# Patient Record
Sex: FEMALE | Race: WHITE | RURAL
[De-identification: names, ages, dates, MRNs, and addresses within clinical notes are randomized per-mention and may not be internally consistent; named-entity substitution may affect disease eponyms.]

---

## 2017-09-29 ENCOUNTER — OFFICE VISIT (OUTPATIENT)
Dept: FAMILY MEDICINE CLINIC | Age: 62
End: 2017-09-29

## 2017-09-29 VITALS
OXYGEN SATURATION: 98 % | HEART RATE: 110 BPM | SYSTOLIC BLOOD PRESSURE: 157 MMHG | HEIGHT: 66 IN | BODY MASS INDEX: 21.05 KG/M2 | TEMPERATURE: 98.8 F | WEIGHT: 131 LBS | DIASTOLIC BLOOD PRESSURE: 94 MMHG

## 2017-09-29 DIAGNOSIS — Z51.81 THERAPEUTIC DRUG MONITORING: ICD-10-CM

## 2017-09-29 DIAGNOSIS — F79 MENTAL RETARDATION: ICD-10-CM

## 2017-09-29 DIAGNOSIS — E78.2 MIXED HYPERLIPIDEMIA: ICD-10-CM

## 2017-09-29 DIAGNOSIS — G40.909 SEIZURE DISORDER (HCC): Primary | ICD-10-CM

## 2017-09-29 RX ORDER — PHENYTOIN SODIUM 100 MG/1
200 CAPSULE, EXTENDED RELEASE ORAL
Qty: 180 CAP | Refills: 3 | Status: SHIPPED | OUTPATIENT
Start: 2017-09-29 | End: 2018-09-25 | Stop reason: SDUPTHER

## 2017-09-29 NOTE — MR AVS SNAPSHOT
Visit Information Date & Time Provider Department Dept. Phone Encounter #  
 9/29/2017 11:30 AM Aixa Patel, Katie Still [id number] Follow-up Instructions Return in about 1 year (around 9/29/2018). Follow-up and Disposition History Upcoming Health Maintenance Date Due Hepatitis C Screening 1955 PAP AKA CERVICAL CYTOLOGY 5/14/1977 BREAST CANCER SCRN MAMMOGRAM 5/14/2006 FOBT Q 1 YEAR AGE 50-75 9/29/2017 DTaP/Tdap/Td series (2 - Td) 9/29/2027 Allergies as of 9/29/2017  Review Complete On: 9/29/2017 By: Aixa Patel MD  
 No Known Allergies Current Immunizations  Never Reviewed No immunizations on file. Not reviewed this visit You Were Diagnosed With   
  
 Codes Comments [facility]iz[facility] ([facility]    -  Primary ICD-10-CM: F32.266 ICD-9-CM: 345.90 Mental retardation     ICD-10-CM: F79 
ICD-9-CM: 700 Therapeutic drug monitoring     ICD-10-CM: Z51.81 
ICD-9-CM: V58.83 Mixed hyperlipidemia     ICD-10-CM: E78.2 ICD-9-CM: 272.2 Vitals BP Pulse Temp Height(growth percentile) Weight(growth percentile) SpO2  
 (!) 157/94 (!) 110 98.8 °F (37.1 °C) (Temporal) 5' 6\" (1.676 m) 131 lb (59.4 kg) 98% BMI OB Status Smoking Status 21.14 kg/m2 Unknown Never Smoker BMI and BSA Data Body Mass Index Body Surface Area  
 21.14 kg/m 2 1.66 m 2 Preferred Pharmacy Pharmacy Name Phone [facility] [phone] [address] [phone] Your Updated Medication List  
  
   
This list is accurate as of: 9/29/17 12:11 PM.  Always use your most recent med list.  
  
  
  
  
 CENTRUM SILVER PO Take  by mouth.  
  
 phenytoin  mg ER capsule Commonly known as:  DILANTIN ER Take 2 Caps by mouth nightly. Indications: prevent seizure Prescriptions Sent to Pharmacy Refills phenytoin ER (DILANTIN ER) 100 mg ER capsule 3 Sig: Take 2 Caps by mouth nightly. Indications: prevent seizure Class: Normal  
 Pharmacy: [facility] [id number]  #: [phone] Route: Oral  
  
We Performed the Following CBC WITH AUTOMATED DIFF [55117 CPT(R)] LIPID PANEL [63444 CPT(R)] METABOLIC PANEL, COMPREHENSIVE [95654 CPT(R)] PHENYTOIN [id number] CPT(R)] Follow-up Instructions Return in about 1 year (around 9/29/2018). Patient Instructions Keep up the good work Introducing [facility]! [employer] introduces [employer] patient portal. Now you can access parts of your medical record, email your doctor's office, and request medication refills online. 1. In your internet browser, go to [email or web address] 2. Click on the First Time User? Click Here link in the Sign In box. You will see the New Member Sign Up page. 3. Enter your [employer] Access Code exactly as it appears below. You will not need to use this code after youve completed the sign-up process. If you do not sign up before the expiration date, you must request a new code. · [employer] Access Code: Eve Alvarez Expires: 12/28/2017 11:35 AM 
 
4. Enter the last four digits of your Social Security Number (xxxx) and Date of Birth (mm/dd/yyyy) as indicated and click Submit. You will be taken to the next sign-up page. 5. Create a [employer] ID. This will be your [employer] login ID and cannot be changed, so think of one that is secure and easy to remember. 6. Create a [employer] password. You can change your password at any time. 7. Enter your Password Reset Question and Answer. This can be used at a later time if you forget your password. 8. Enter your e-mail address. You will receive e-mail notification when new information is available in [address] 9. Click Sign Up. You can now view and download portions of your medical record. 10. Click the Download Summary menu link to download a portable copy of your medical information. If you have questions, please visit the Frequently Asked Questions section of the [employer] website. Remember, [employer] is NOT to be used for urgent needs. For medical emergencies, dial 911. Now available from your iPhone and Android! Please provide this summary of care documentation to your next provider. Your primary care clinician is listed as Alayna Mott. If you have any questions after today's visit, please call [phone].

## 2017-09-29 NOTE — PROGRESS NOTES
HISTORY OF PRESENT ILLNESS  Piotr Akers is a 63 y.o. Female here for f/u seizure d/o. Doing well since last visit. Taking Dilantin 200 at bedtime. Stef well. No seizures since last visit, actually in many years. Pt feeling well, eating normally. Lab Results   Component Value Date/Time    Phenytoin 6.5 07/22/2016 09:21 AM     Lab Results   Component Value Date/Time    WBC 5.9 07/22/2016 09:21 AM    HGB 13.5 07/22/2016 09:21 AM    HCT 39.7 07/22/2016 09:21 AM    PLATELET 877 86/27/2304 09:21 AM    MCV 86 07/22/2016 09:21 AM     Lab Results   Component Value Date/Time    Sodium 141 07/22/2016 09:21 AM    Potassium 4.2 07/22/2016 09:21 AM    Chloride 98 07/22/2016 09:21 AM    CO2 25 07/22/2016 09:21 AM    Glucose 93 07/22/2016 09:21 AM    BUN 8 07/22/2016 09:21 AM    Creatinine 0.71 07/22/2016 09:21 AM    BUN/Creatinine ratio 11 07/22/2016 09:21 AM    GFR est  07/22/2016 09:21 AM    GFR est non-AA 93 07/22/2016 09:21 AM    Calcium 9.7 07/22/2016 09:21 AM    Bilirubin, total 0.3 07/22/2016 09:21 AM    AST (SGOT) 15 07/22/2016 09:21 AM    Alk. phosphatase 127 07/22/2016 09:21 AM    Protein, total 7.5 07/22/2016 09:21 AM    Albumin 4.7 07/22/2016 09:21 AM    A-G Ratio 1.7 07/22/2016 09:21 AM    ALT (SGPT) 17 07/22/2016 09:21 AM     Lab Results   Component Value Date/Time    Cholesterol, total 246 07/22/2016 09:21 AM    HDL Cholesterol 88 07/22/2016 09:21 AM    LDL, calculated 140 07/22/2016 09:21 AM    VLDL, calculated 18 07/22/2016 09:21 AM    Triglyceride 91 07/22/2016 09:21 AM     PMH, SH, Medications/Allergies: reviewed, on chart.     ROS:  Constitutional: No fever, chills or weight loss  Respiratory: No cough, SOB   CV: No chest pain or Palpitations    Visit Vitals    BP (!) 157/94    Pulse (!) 110    Temp 98.8 °F (37.1 °C) (Temporal)    Ht 5' 6\" (1.676 m)    Wt 131 lb (59.4 kg)    SpO2 98%    BMI 21.14 kg/m2     Wt Readings from Last 3 Encounters:   09/29/17 131 lb (59.4 kg)   07/22/16 140 lb (63.5 kg)   07/16/15 133 lb (60.3 kg)     Physical Exam   Constitutional: She is oriented to person, place, and time. She appears well-developed and well-nourished. HENT:   Head: Normocephalic. Eyes: EOM are normal. Pupils are equal, round, and reactive to light. Neck: No JVD present. No thyromegaly present. Cardiovascular: Normal rate and regular rhythm. No M/R/G. Pulmonary/Chest: Breath sounds normal.   Musculoskeletal: She exhibits no edema and no tenderness. Neurological: She is alert and oriented to person. Mild spastic change to R arm and fingers. Skin: Skin is warm and dry. Psychiatric: She has a normal mood and affect. Her behavior is normal. Thought content sparse, pt is mostly nonverbal.     ASSESSMENT and PLAN  1. Seizure disorder and CP. Doing well. Refill Dilantin 200 at bedtime  Check annual dilantin level, CMP, and CBC. Supervised full duty    Breast cancer screening  Discussed recommendations, pt will consider. Colon ca screening  Recheck annual FOBT. FIT given again. Hyperlipidemia  Check lipids. Cervical ca screening  Pt has no exposure, never been sexually active. Declines screening today. F/U annually for recheck.

## 2017-09-30 LAB
ALBUMIN SERPL-MCNC: 5 G/DL (ref 3.6–4.8)
ALBUMIN/GLOB SERPL: 1.8 {RATIO} (ref 1.2–2.2)
ALP SERPL-CCNC: 128 IU/L (ref 39–117)
ALT SERPL-CCNC: 12 IU/L (ref 0–32)
AST SERPL-CCNC: 16 IU/L (ref 0–40)
BASOPHILS # BLD AUTO: 0 X10E3/UL (ref 0–0.2)
BASOPHILS NFR BLD AUTO: 1 %
BILIRUB SERPL-MCNC: 0.3 MG/DL (ref 0–1.2)
BUN SERPL-MCNC: 7 MG/DL (ref 8–27)
BUN/CREAT SERPL: 9 (ref 12–28)
CALCIUM SERPL-MCNC: 9.9 MG/DL (ref 8.7–10.3)
CHLORIDE SERPL-SCNC: 100 MMOL/L (ref 96–106)
CHOLEST SERPL-MCNC: 253 MG/DL (ref 100–199)
CO2 SERPL-SCNC: 25 MMOL/L (ref 18–29)
CREAT SERPL-MCNC: 0.75 MG/DL (ref 0.57–1)
EOSINOPHIL # BLD AUTO: 0.1 X10E3/UL (ref 0–0.4)
EOSINOPHIL NFR BLD AUTO: 1 %
ERYTHROCYTE [DISTWIDTH] IN BLOOD BY AUTOMATED COUNT: 13.3 % (ref 12.3–15.4)
GLOBULIN SER CALC-MCNC: 2.8 G/DL (ref 1.5–4.5)
GLUCOSE SERPL-MCNC: 100 MG/DL (ref 65–99)
HCT VFR BLD AUTO: 40.4 % (ref 34–46.6)
HDLC SERPL-MCNC: 102 MG/DL
HGB BLD-MCNC: 13.8 G/DL (ref 11.1–15.9)
IMM GRANULOCYTES # BLD: 0 X10E3/UL (ref 0–0.1)
IMM GRANULOCYTES NFR BLD: 0 %
LDLC SERPL CALC-MCNC: 127 MG/DL (ref 0–99)
LYMPHOCYTES # BLD AUTO: 1 X10E3/UL (ref 0.7–3.1)
LYMPHOCYTES NFR BLD AUTO: 17 %
MCH RBC QN AUTO: 29.9 PG (ref 26.6–33)
MCHC RBC AUTO-ENTMCNC: 34.2 G/DL (ref 31.5–35.7)
MCV RBC AUTO: 88 FL (ref 79–97)
MONOCYTES # BLD AUTO: 0.5 X10E3/UL (ref 0.1–0.9)
MONOCYTES NFR BLD AUTO: 9 %
NEUTROPHILS # BLD AUTO: 4 X10E3/UL (ref 1.4–7)
NEUTROPHILS NFR BLD AUTO: 72 %
PHENYTOIN SERPL-MCNC: NORMAL UG/ML
PLATELET # BLD AUTO: 255 X10E3/UL (ref 150–379)
POTASSIUM SERPL-SCNC: 3.7 MMOL/L (ref 3.5–5.2)
PROT SERPL-MCNC: 7.8 G/DL (ref 6–8.5)
RBC # BLD AUTO: 4.61 X10E6/UL (ref 3.77–5.28)
SODIUM SERPL-SCNC: 141 MMOL/L (ref 134–144)
TRIGL SERPL-MCNC: 121 MG/DL (ref 0–149)
VLDLC SERPL CALC-MCNC: 24 MG/DL (ref 5–40)
WBC # BLD AUTO: 5.6 X10E3/UL (ref 3.4–10.8)

## 2017-10-10 LAB — PHENYTOIN SERPL-MCNC: NORMAL UG/ML

## 2021-09-15 ENCOUNTER — VIRTUAL VISIT (OUTPATIENT)
Dept: FAMILY MEDICINE CLINIC | Age: 66
End: 2021-09-15
Payer: MEDICARE

## 2021-09-15 DIAGNOSIS — F79 INTELLECTUAL DISABILITY: ICD-10-CM

## 2021-09-15 DIAGNOSIS — Z12.11 COLON CANCER SCREENING: ICD-10-CM

## 2021-09-15 DIAGNOSIS — G80.4 ATAXIC CEREBRAL PALSY (HCC): ICD-10-CM

## 2021-09-15 DIAGNOSIS — G40.909 SEIZURE DISORDER (HCC): ICD-10-CM

## 2021-09-15 DIAGNOSIS — Z13.31 SCREENING FOR DEPRESSION: ICD-10-CM

## 2021-09-15 DIAGNOSIS — Z13.39 SCREENING FOR ALCOHOLISM: ICD-10-CM

## 2021-09-15 DIAGNOSIS — Z51.81 THERAPEUTIC DRUG MONITORING: ICD-10-CM

## 2021-09-15 DIAGNOSIS — Z00.00 MEDICARE ANNUAL WELLNESS VISIT, SUBSEQUENT: Primary | ICD-10-CM

## 2021-09-15 DIAGNOSIS — Z51.81 ENCOUNTER FOR MEDICATION MONITORING: ICD-10-CM

## 2021-09-15 PROCEDURE — G0444 DEPRESSION SCREEN ANNUAL: HCPCS | Performed by: FAMILY MEDICINE

## 2021-09-15 PROCEDURE — G0439 PPPS, SUBSEQ VISIT: HCPCS | Performed by: FAMILY MEDICINE

## 2021-09-15 RX ORDER — PHENYTOIN SODIUM 100 MG/1
200 CAPSULE, EXTENDED RELEASE ORAL
Qty: 180 CAPSULE | Refills: 3 | Status: SHIPPED | OUTPATIENT
Start: 2021-09-15 | End: 2021-11-12

## 2021-09-15 NOTE — PROGRESS NOTES
Chief Complaint   Patient presents with    Seizure     Chief Complaint   Patient presents with    Seizure     1. Have you been to the ER, urgent care clinic since your last visit? Hospitalized since your last visit? No  2. Have you seen or consulted any other health care providers outside of the [address] [place] [address] since your last visit? Include any pap smears or colon screening. No    HISTORY OF PRESENT ILLNESS  Corrie Dailey is a 63 y.o. Female here for f/u seizure d/o. Doing well since last visit. Taking Dilantin 200 at bedtime. Stef well. No seizures since last visit, actually in many years. Pt feeling well, eating normally. Lab Results   Component Value Date/Time    Phenytoin 7.3 (L) 09/14/2020 08:59 AM     Lab Results   Component Value Date/Time    WBC 5.0 09/14/2020 08:59 AM    HGB 13.5 09/14/2020 08:59 AM    HCT 40.5 09/14/2020 08:59 AM    PLATELET 929 98/45/7433 08:59 AM    MCV 93 09/14/2020 08:59 AM     Lab Results   Component Value Date/Time    Sodium 141 10/16/2018 03:32 PM    Potassium 4.0 10/16/2018 03:32 PM    Chloride 100 10/16/2018 03:32 PM    CO2 24 10/16/2018 03:32 PM    Glucose 111 (H) 10/16/2018 03:32 PM    BUN 10 10/16/2018 03:32 PM    Creatinine 0.77 10/16/2018 03:32 PM    BUN/Creatinine ratio 13 10/16/2018 03:32 PM    GFR est AA 96 10/16/2018 03:32 PM    GFR est non-AA 83 10/16/2018 03:32 PM    Calcium 9.8 10/16/2018 03:32 PM    Bilirubin, total <0.2 10/16/2018 03:32 PM    Alk.  phosphatase 111 10/16/2018 03:32 PM    Protein, total 7.7 10/16/2018 03:32 PM    Albumin 5.1 (H) 10/16/2018 03:32 PM    A-G Ratio 2.0 10/16/2018 03:32 PM    ALT (SGPT) 14 10/16/2018 03:32 PM     Lab Results   Component Value Date/Time    Cholesterol, total 244 (H) 09/14/2020 08:59 AM    HDL Cholesterol 92 09/14/2020 08:59 AM    LDL, calculated 143 (H) 09/14/2020 08:59 AM    LDL, calculated 120 (H) 10/16/2018 03:32 PM    VLDL, calculated 9 09/14/2020 08:59 AM    VLDL, calculated 45 (H) 10/16/2018 03:32 PM Triglyceride 58 09/14/2020 08:59 AM       PMH, , Medications/Allergies: reviewed, on chart. If parent gets ill, plans move in with brother Katya Nails in [place].  Current Outpatient Medications   Medication Sig    phenytoin ER (DILANTIN ER) 100 mg ER capsule Take 2 Capsules by mouth nightly. Indications: DUE FOR PHONE VISIT    FOLIC ACID/MULTIVIT-MIN/LUTEIN (CENTRUM SILVER PO) Take  by mouth. No current facility-administered medications for this visit. No Known Allergies    ROS:  Constitutional: No fever, chills or abnormal weight loss  Respiratory: No cough, SOB   CV: No chest pain or Palpitations    VS review: Wt Readings from Last 3 Encounters:   10/16/18 131 lb (59.4 kg)   09/29/17 131 lb (59.4 kg)   07/22/16 140 lb (63.5 kg)     BP Readings from Last 3 Encounters:   10/16/18 152/87   09/29/17 (!) 157/94   07/22/16 154/80       Objective:     General: alert, cooperative, no distress   Mental  status: mental status: alert, oriented to person, place. normal mood, behavior, speech, dress, motor activity, and thought processes   Resp: resp: normal effort and no respiratory distress   Neuro: neuro: no gross deficits         Assessment & Plan:     1. Seizure disorder and CP. Doing well. Con't Dilantin 200 at bedtime  Check annual dilantin level, CMP, and CBC. HCM:  Breast cancer screening  Discussed recommendations, pt will consider. Colon ca screening  Recheck annual FOBT. Has FIT kit, bring by when ready. Hyperlipidemia  Check lipids. F/U annually for recheck.  ______________________________________________________________________    Belinda Crawley is a 73 y.o. female and presents for annual Medicare Wellness Visit. Problem List: Reviewed with patient and discussed risk factors.     Patient Active Problem List   Diagnosis Code    Seizure disorder ([facility] G40.909    Intellectual disability F79    Cerebral palsy ([facility] G80.9    Encounter for medication monitoring Z51.81    Mixed hyperlipidemia E78.2       1. Have you been to the ER, urgent care clinic since your last visit? Hospitalized since your last visit? No    2. Have you seen or consulted any other health care providers outside of the [address] [place] [address] [place] since your last visit? Include any pap smears or colon screening. No    Current medical providers:  Patient Care Team:  Severa Dowse, MD as PCP - General (Family Medicine)  Severa Dowse, MD as PCP - [facility] Empaneled Provider  Severa Dowse, MD (Family Medicine)  Billy Melgar NP as Nurse Practitioner (Dermatology)    [facility], [facility], Medications/Allergies: reviewed, on chart. Female Alcohol Screening: On any occasion during the past 3 months, have you had more than 3 drinks containing alcohol? No    Do you average more than 7 drinks per week? No      ROS:  Constitutional: No fever, chills or weight loss  Respiratory: No cough, SOB   CV: No chest pain or Palpitations    Objective:    Assessment of cognitive impairment: Alert and oriented x 2  [employer] 0/3 recall    Depression Screen:   3 most recent PHQ Screens 9/15/2021   Little interest or pleasure in doing things Not at all   Feeling down, depressed, irritable, or hopeless Not at all   Total Score PHQ 2 0     Depression screening performed and reviewed with patient for 8-15 minutes    Fall Risk Assessment:    Fall Risk Assessment, last 12 mths 9/15/2021   Able to walk? Yes   Fall in past 12 months? 0   Do you feel unsteady? 0   Are you worried about falling 0       Functional Ability:   Does the patient exhibit a steady gait? yes   How long did it take the patient to get up and walk from a sitting position? n   Is the patient self reliant?  (ie can do own laundry, meals, household chores)  no     Does the patient handle his/her own medications? yes     Does the patient handle his/her own money?    no     Is the patients home safe (ie good lighting, handrails on stairs and bath, etc.)? yes     Did you notice or did patient express any hearing difficulties? no     Did you notice or did patient express any vision difficulties? no       Advance Care Planning:   Patient was offered the opportunity to discuss advance care planning:  yes     Does patient have an Advance Directive:  no   If no, did you provide information on [facility]ctions? yes       Plan:    Covidvax, Flu and shingles shot recommended, will consider. Can get shingrix at local pharmacy on request.    Work on AMD at next check. Mammogram  PT will consider, declines today. Cervical ca screening  Pt has no exposure, never been sexually active. Declines screening today. Colon ca screening  Discussed FIT, will  card when comes in for labs. Orders Placed This Encounter    Depression Screen Annual       Health Maintenance   Topic Date Due    COVID-19 Vaccine (1) Never done    Cervical cancer screen  Never done    Shingrix Vaccine Age 50> (1 of 2) Never done    Breast Cancer Screen Mammogram  Never done    Colorectal Cancer Screening Combo  2017    Bone Densitometry (Dexa) Screening  Never done    Pneumococcal 65+ years (1 of 1 - PPSV23) Never done    Flu Vaccine (1) Never done    Medicare Yearly Exam  2022    Lipid Screen  2025    DTaP/Tdap/Td series (2 - Td or Tdap) 2027    Hepatitis C Screening  Completed     *Patient verbalized understanding and agreement with the plan. A copy of the After Visit Summary with personalized health plan was given to the patient today. Identified pt with two pt identifiers(name and ). Reviewed record in preparation for visit and have obtained necessary documentation.     Symptom review:    NO  Fever   NO  Shaking chills  NO  Cough  NO Headaches  NO  Body aches  NO  Coughing up blood  NO  Chest congestion  NO  Chest pain  NO  Shortness of breath  NO  Profound Loss of smell/taste  NO  Nausea/Vomiting   NO  Loose stool/Diarrhea  NO any skin issues

## 2021-09-29 ENCOUNTER — LAB ONLY (OUTPATIENT)
Dept: FAMILY MEDICINE CLINIC | Age: 66
End: 2021-09-29

## 2021-09-29 DIAGNOSIS — Z51.81 THERAPEUTIC DRUG MONITORING: ICD-10-CM

## 2021-09-29 DIAGNOSIS — G40.909 SEIZURE DISORDER (HCC): ICD-10-CM

## 2021-09-29 LAB
ALBUMIN SERPL-MCNC: 4.3 G/DL (ref 3.5–5)
ALBUMIN/GLOB SERPL: 1.2 {RATIO} (ref 1.1–2.2)
ALP SERPL-CCNC: 134 U/L (ref 45–117)
ALT SERPL-CCNC: 23 U/L (ref 12–78)
ANION GAP SERPL CALC-SCNC: 6 MMOL/L (ref 5–15)
AST SERPL-CCNC: 14 U/L (ref 15–37)
BASOPHILS # BLD: 0 K/UL (ref 0–0.1)
BASOPHILS NFR BLD: 1 % (ref 0–1)
BILIRUB SERPL-MCNC: 0.3 MG/DL (ref 0.2–1)
BUN SERPL-MCNC: 15 MG/DL (ref 6–20)
BUN/CREAT SERPL: 20 (ref 12–20)
CALCIUM SERPL-MCNC: 9.5 MG/DL (ref 8.5–10.1)
CHLORIDE SERPL-SCNC: 106 MMOL/L (ref 97–108)
CO2 SERPL-SCNC: 27 MMOL/L (ref 21–32)
CREAT SERPL-MCNC: 0.74 MG/DL (ref 0.55–1.02)
DIFFERENTIAL METHOD BLD: ABNORMAL
EOSINOPHIL # BLD: 0.2 K/UL (ref 0–0.4)
EOSINOPHIL NFR BLD: 4 % (ref 0–7)
ERYTHROCYTE [DISTWIDTH] IN BLOOD BY AUTOMATED COUNT: 12.2 % (ref 11.5–14.5)
GLOBULIN SER CALC-MCNC: 3.7 G/DL (ref 2–4)
GLUCOSE SERPL-MCNC: 90 MG/DL (ref 65–100)
HCT VFR BLD AUTO: 41.1 % (ref 35–47)
HGB BLD-MCNC: 13.6 G/DL (ref 11.5–16)
IMM GRANULOCYTES # BLD AUTO: 0 K/UL (ref 0–0.04)
IMM GRANULOCYTES NFR BLD AUTO: 1 % (ref 0–0.5)
LYMPHOCYTES # BLD: 1.3 K/UL (ref 0.8–3.5)
LYMPHOCYTES NFR BLD: 21 % (ref 12–49)
MCH RBC QN AUTO: 30.4 PG (ref 26–34)
MCHC RBC AUTO-ENTMCNC: 33.1 G/DL (ref 30–36.5)
MCV RBC AUTO: 91.9 FL (ref 80–99)
MONOCYTES # BLD: 0.6 K/UL (ref 0–1)
MONOCYTES NFR BLD: 9 % (ref 5–13)
NEUTS SEG # BLD: 3.8 K/UL (ref 1.8–8)
NEUTS SEG NFR BLD: 64 % (ref 32–75)
NRBC # BLD: 0 K/UL (ref 0–0.01)
NRBC BLD-RTO: 0 PER 100 WBC
PHENYTOIN SERPL-MCNC: 11.6 UG/ML (ref 10–20)
PLATELET # BLD AUTO: 235 K/UL (ref 150–400)
PMV BLD AUTO: 11.2 FL (ref 8.9–12.9)
POTASSIUM SERPL-SCNC: 3.9 MMOL/L (ref 3.5–5.1)
PROT SERPL-MCNC: 8 G/DL (ref 6.4–8.2)
RBC # BLD AUTO: 4.47 M/UL (ref 3.8–5.2)
SODIUM SERPL-SCNC: 139 MMOL/L (ref 136–145)
WBC # BLD AUTO: 5.9 K/UL (ref 3.6–11)

## 2022-03-19 PROBLEM — E78.2 MIXED HYPERLIPIDEMIA: Status: ACTIVE | Noted: 2017-09-29

## 2022-09-06 ENCOUNTER — TELEPHONE (OUTPATIENT)
Dept: FAMILY MEDICINE CLINIC | Age: 67
End: 2022-09-06

## 2022-09-06 DIAGNOSIS — G40.909 SEIZURE DISORDER (HCC): ICD-10-CM

## 2022-09-06 DIAGNOSIS — Z51.81 THERAPEUTIC DRUG MONITORING: ICD-10-CM

## 2022-09-06 RX ORDER — PHENYTOIN SODIUM 100 MG/1
CAPSULE, EXTENDED RELEASE ORAL
Qty: 180 CAPSULE | Refills: 1 | Status: SHIPPED | OUTPATIENT
Start: 2022-09-06 | End: 2022-10-04 | Stop reason: SDUPTHER

## 2022-09-06 NOTE — TELEPHONE ENCOUNTER
Pt out of her phenytoin. Has been 1y, but chart review shows she had good labs 2/2022, will fill x3mo and arrange visit.

## 2022-09-20 ENCOUNTER — APPOINTMENT (OUTPATIENT)
Dept: FAMILY MEDICINE CLINIC | Age: 67
End: 2022-09-20

## 2022-09-20 DIAGNOSIS — Z51.81 THERAPEUTIC DRUG MONITORING: ICD-10-CM

## 2022-09-22 LAB
ALBUMIN SERPL-MCNC: 4.4 G/DL (ref 3.5–5)
ALBUMIN/GLOB SERPL: 1.2 {RATIO} (ref 1.1–2.2)
ALP SERPL-CCNC: 126 U/L (ref 45–117)
ALT SERPL-CCNC: 25 U/L (ref 12–78)
ANION GAP SERPL CALC-SCNC: 6 MMOL/L (ref 5–15)
AST SERPL-CCNC: 16 U/L (ref 15–37)
BASOPHILS # BLD: 0 K/UL (ref 0–0.1)
BASOPHILS NFR BLD: 1 % (ref 0–1)
BILIRUB SERPL-MCNC: 0.4 MG/DL (ref 0.2–1)
BUN SERPL-MCNC: 12 MG/DL (ref 6–20)
BUN/CREAT SERPL: 14 (ref 12–20)
CALCIUM SERPL-MCNC: 9.4 MG/DL (ref 8.5–10.1)
CHLORIDE SERPL-SCNC: 104 MMOL/L (ref 97–108)
CO2 SERPL-SCNC: 27 MMOL/L (ref 21–32)
CREAT SERPL-MCNC: 0.83 MG/DL (ref 0.55–1.02)
DIFFERENTIAL METHOD BLD: NORMAL
EOSINOPHIL # BLD: 0.1 K/UL (ref 0–0.4)
EOSINOPHIL NFR BLD: 2 % (ref 0–7)
ERYTHROCYTE [DISTWIDTH] IN BLOOD BY AUTOMATED COUNT: 11.9 % (ref 11.5–14.5)
GLOBULIN SER CALC-MCNC: 3.6 G/DL (ref 2–4)
GLUCOSE SERPL-MCNC: 90 MG/DL (ref 65–100)
HCT VFR BLD AUTO: 40.2 % (ref 35–47)
HGB BLD-MCNC: 13.6 G/DL (ref 11.5–16)
IMM GRANULOCYTES # BLD AUTO: 0 K/UL (ref 0–0.04)
IMM GRANULOCYTES NFR BLD AUTO: 0 % (ref 0–0.5)
LYMPHOCYTES # BLD: 1.1 K/UL (ref 0.8–3.5)
LYMPHOCYTES NFR BLD: 23 % (ref 12–49)
MCH RBC QN AUTO: 30.8 PG (ref 26–34)
MCHC RBC AUTO-ENTMCNC: 33.8 G/DL (ref 30–36.5)
MCV RBC AUTO: 91.2 FL (ref 80–99)
MONOCYTES # BLD: 0.5 K/UL (ref 0–1)
MONOCYTES NFR BLD: 10 % (ref 5–13)
NEUTS SEG # BLD: 2.9 K/UL (ref 1.8–8)
NEUTS SEG NFR BLD: 64 % (ref 32–75)
NRBC # BLD: 0 K/UL (ref 0–0.01)
NRBC BLD-RTO: 0 PER 100 WBC
PHENYTOIN SERPL-MCNC: 8.2 UG/ML (ref 10–20)
PLATELET # BLD AUTO: 232 K/UL (ref 150–400)
PMV BLD AUTO: 11.2 FL (ref 8.9–12.9)
POTASSIUM SERPL-SCNC: 3.9 MMOL/L (ref 3.5–5.1)
PROT SERPL-MCNC: 8 G/DL (ref 6.4–8.2)
RBC # BLD AUTO: 4.41 M/UL (ref 3.8–5.2)
SODIUM SERPL-SCNC: 137 MMOL/L (ref 136–145)
WBC # BLD AUTO: 4.6 K/UL (ref 3.6–11)

## 2022-10-04 ENCOUNTER — VIRTUAL VISIT (OUTPATIENT)
Dept: FAMILY MEDICINE CLINIC | Age: 67
End: 2022-10-04
Payer: MEDICARE

## 2022-10-04 DIAGNOSIS — G40.909 SEIZURE DISORDER (HCC): ICD-10-CM

## 2022-10-04 DIAGNOSIS — Z12.11 COLON CANCER SCREENING: ICD-10-CM

## 2022-10-04 DIAGNOSIS — Z51.81 THERAPEUTIC DRUG MONITORING: ICD-10-CM

## 2022-10-04 DIAGNOSIS — Z00.00 MEDICARE ANNUAL WELLNESS VISIT, SUBSEQUENT: Primary | ICD-10-CM

## 2022-10-04 DIAGNOSIS — Z13.31 SCREENING FOR DEPRESSION: ICD-10-CM

## 2022-10-04 DIAGNOSIS — Z13.39 SCREENING FOR ALCOHOLISM: ICD-10-CM

## 2022-10-04 PROCEDURE — G0439 PPPS, SUBSEQ VISIT: HCPCS | Performed by: FAMILY MEDICINE

## 2022-10-04 PROCEDURE — G2025 DIS SITE TELE SVCS RHC/FQHC: HCPCS | Performed by: FAMILY MEDICINE

## 2022-10-04 RX ORDER — PHENYTOIN SODIUM 100 MG/1
CAPSULE, EXTENDED RELEASE ORAL
Qty: 180 CAPSULE | Refills: 1 | Status: SHIPPED | OUTPATIENT
Start: 2022-10-04 | End: 2022-10-31

## 2022-10-04 NOTE — PROGRESS NOTES
Aniket Ryder is a 78 y.o. female who was seen by synchronous (real-time) audio technology on 10/4/2022. Pt was seen at home. Provider was at the office. Due to hx developmental delay/ CP, Parent/caregiver gave history for this encounter. Subjective:   Results (Discuss results)    HISTORY OF PRESENT ILLNESS  Aniket Ryder is a 63 y.o. Female here for f/u seizure d/o. Doing well since last visit. Taking Dilantin 200 at bedtime. Stef well. No seizures since last visit, actually in many years. Pt feeling well, eating normally. Lab Results   Component Value Date/Time    Phenytoin 8.2 (L) 09/20/2022 10:57 AM     Lab Results   Component Value Date/Time    WBC 4.6 09/20/2022 10:57 AM    HGB 13.6 09/20/2022 10:57 AM    HCT 40.2 09/20/2022 10:57 AM    PLATELET 018 20/54/0483 10:57 AM    MCV 91.2 09/20/2022 10:57 AM     Lab Results   Component Value Date/Time    Sodium 137 09/20/2022 10:57 AM    Potassium 3.9 09/20/2022 10:57 AM    Chloride 104 09/20/2022 10:57 AM    CO2 27 09/20/2022 10:57 AM    Anion gap 6 09/20/2022 10:57 AM    Glucose 90 09/20/2022 10:57 AM    BUN 12 09/20/2022 10:57 AM    Creatinine 0.83 09/20/2022 10:57 AM    BUN/Creatinine ratio 14 09/20/2022 10:57 AM    GFR est AA >60 09/20/2022 10:57 AM    GFR est non-AA >60 09/20/2022 10:57 AM    Calcium 9.4 09/20/2022 10:57 AM    Bilirubin, total 0.4 09/20/2022 10:57 AM    Alk.  phosphatase 126 (H) 09/20/2022 10:57 AM    Protein, total 8.0 09/20/2022 10:57 AM    Albumin 4.4 09/20/2022 10:57 AM    Globulin 3.6 09/20/2022 10:57 AM    A-G Ratio 1.2 09/20/2022 10:57 AM    ALT (SGPT) 25 09/20/2022 10:57 AM     Lab Results   Component Value Date/Time    Cholesterol, total 244 (H) 09/14/2020 08:59 AM    HDL Cholesterol 92 09/14/2020 08:59 AM    LDL, calculated 143 (H) 09/14/2020 08:59 AM    LDL, calculated 120 (H) 10/16/2018 03:32 PM    VLDL, calculated 9 09/14/2020 08:59 AM    VLDL, calculated 45 (H) 10/16/2018 03:32 PM    Triglyceride 58 09/14/2020 08:59 AM PMH, SH, Medications/Allergies: reviewed, on chart. If parent gets ill, plans move in with brother William Ruby in [place].  Current Outpatient Medications   Medication Sig    phenytoin ER (DILANTIN ER) 100 mg ER capsule TAKE 2 CAPSULES BY MOUTH EVERY NIGHT    FOLIC ACID/MULTIVIT-MIN/LUTEIN (CENTRUM SILVER PO) Take  by mouth. No current facility-administered medications for this visit. No Known Allergies    ROS:  Constitutional: No fever, chills or abnormal weight loss  Respiratory: No cough, SOB   CV: No chest pain or Palpitations    VS review: Wt Readings from Last 3 Encounters:   10/16/18 131 lb (59.4 kg)   09/29/17 131 lb (59.4 kg)   07/22/16 140 lb (63.5 kg)     BP Readings from Last 3 Encounters:   10/16/18 152/87   09/29/17 (!) 157/94   07/22/16 154/80       Objective:     General: alert, cooperative, no distress   Mental  status: mental status: alert, oriented to person, place. normal mood, behavior, speech, dress, motor activity, and thought processes   Resp: resp: normal effort and no respiratory distress   Neuro: neuro: no gross deficits         Assessment & Plan:     1. Seizure disorder and CP. Doing well. Con't Dilantin 200 at bedtime  Dilantin level, CMP, and CBC all good 9/2022. F/U annually for recheck. Time-based coding, delete if not needed: I spent at least 15 minutes with this established patient, and >50% of the time was spent counseling and/or coordinating care regarding care plan and expected course  Mary Saavedra MD    Due to this being a TeleHealth evaluation, many elements of the physical examination are unable to be assessed. We discussed the expected course, resolution and complications of the diagnosis(es) in detail. Medication risks, benefits, costs, interactions, and alternatives were discussed as indicated. I advised her to contact the office if her condition worsens, changes or fails to improve as anticipated.  She expressed understanding with the diagnosis(es) and plan. Pursuant to the emergency declaration under the [facility], 1135 waiver authority and the [employer] Act, this Virtual  Visit was conducted, with patient's consent, to reduce the patient's risk of exposure to COVID-19 and provide continuity of care for an established patient. Services were provided through audio synchronous discussion virtually to substitute for in-person clinic visit. Consent:  She and/or her healthcare decision maker is aware that this patient-initiated Telehealth encounter is a billable service, which includes applicable copays. This virtual visit was conducted with patient's (and/or legal guardian's) consent. The patient was located in a state where the provider was licensed to provide care    ______________________________________________________________________    Swathi Osorio is a 78 y.o. female and presents for annual Medicare Wellness Visit. Problem List: Reviewed with patient and discussed risk factors. Patient Active Problem List   Diagnosis Code    Seizure disorder ([facility] [address] G40.909    Intellectual disability F79    Cerebral palsy ([facility] [address] G80.9    Encounter for medication monitoring Z51.81    Mixed hyperlipidemia E78.2       1. Have you been to the ER, urgent care clinic since your last visit? Hospitalized since your last visit? No    2. Have you seen or consulted any other health care providers outside of the [address] [place] [address] since your last visit? Include any pap smears or colon screening. No    Current medical providers:  Patient Care Team:  Zuleika Griffith MD as PCP - General (Family Medicine)  Zuleika Griffith MD as PCP - [facility]  Zuleika Griffith MD (Family Medicine)  Jolene Jj NP as Nurse Practitioner (Dermatology Physician)    [facility], Medications/Allergies: reviewed, on chart.     Female Alcohol Screening: On any occasion during the past 3 months, have you had more than 3 drinks containing alcohol? No    Do you average more than 7 drinks per week? No      ROS:  Constitutional: No fever, chills or weight loss  Respiratory: No cough, SOB   CV: No chest pain or Palpitations    Objective:    Assessment of cognitive impairment: Alert and oriented x 2  [employer] 0/3 recall    Depression Screen:   3 most recent PHQ Screens 10/4/2022   Little interest or pleasure in doing things Not at all   Feeling down, depressed, irritable, or hopeless Not at all   Total Score PHQ 2 0     Depression screening performed and reviewed with patient for 8-15 minutes    Fall Risk Assessment:    Fall Risk Assessment, last 12 mths 10/4/2022   Able to walk? Yes   Fall in past 12 months? 0   Do you feel unsteady? 0   Are you worried about falling 0       Functional Ability:   Does the patient exhibit a steady gait? yes   How long did it take the patient to get up and walk from a sitting position? n   Is the patient self reliant?  (ie can do own laundry, meals, household chores)  no     Does the patient handle his/her own medications? yes     Does the patient handle his/her own money? no     Is the patients home safe (ie good lighting, handrails on stairs and bath, etc.)? yes     Did you notice or did patient express any hearing difficulties? no     Did you notice or did patient express any vision difficulties? no       Advance Care Planning:   Patient was offered the opportunity to discuss advance care planning:  yes     Does patient have an Advance Directive:  no   If no, did you provide information on [facility]ctions? yes       Plan:    Covidvax, Flu and shingles shot recommended, will consider. Can get shingrix at local pharmacy on request.    Has AMD on file. Mammogram  PT will consider, declines today. Colon ca screening  Discussed FIT, will  card when comes in for labs.     Orders Placed This Encounter    Depression Screen Annual    OCCULT BLOOD IMMUNOASSAY,DIAGNOSTIC    phenytoin ER (DILANTIN ER) 100 mg ER capsule       Health Maintenance   Topic Date Due    COVID-19 Vaccine (1) Never done    Shingrix Vaccine Age 50> (1 of 2) Never done    Breast Cancer Screen Mammogram  Never done    Colorectal Cancer Screening Combo  2017    Bone Densitometry (Dexa) Screening  Never done    Pneumococcal 65+ years (1 - PCV) Never done    Flu Vaccine (1) Never done    Depression Screen  09/15/2022    Medicare Yearly Exam  10/05/2023    Lipid Screen  2025    DTaP/Tdap/Td series (2 - Td or Tdap) 2027    Hepatitis C Screening  Completed     *Patient verbalized understanding and agreement with the plan. A copy of the After Visit Summary with personalized health plan was given to the patient today. Identified pt with two pt identifiers(name and ). Reviewed record in preparation for visit and have obtained necessary documentation.

## 2022-10-04 NOTE — PROGRESS NOTES
Hmea Schneider is a 78 y.o. female presenting for/with:    Chief Complaint   Patient presents with    Results     Discuss results       There were no vitals taken for this visit. Pain Scale: /10  Pain Location:     1. \"Have you been to the ER, urgent care clinic since your last visit? Hospitalized since your last visit? \" No    2. \"Have you seen or consulted any other health care providers outside of the [address] [place] [address] since your last visit? \" No     3. For patients aged 39-70: Has the patient had a colonoscopy / FIT/ Cologuard? NA - based on age      If the patient is female:    4. For patients aged 41-77: Has the patient had a mammogram within the past 2 years? NA - based on age or sex      11. For patients aged 21-65: Has the patient had a pap smear? NA - based on age or sex      Symptom review:  Learning Assessment 9/15/2021   PRIMARY LEARNER Father   PRIMARY LANGUAGE ENGLISH   LEARNER PREFERENCE PRIMARY OTHER (COMMENT)   ANSWERED BY Parent   RELATIONSHIP LEGAL GUARDIAN     Fall Risk Assessment, last 12 mths 10/4/2022   Able to walk? Yes   Fall in past 12 months? 0   Do you feel unsteady? 0   Are you worried about falling 0       3 most recent PHQ Screens 10/4/2022   Little interest or pleasure in doing things Not at all   Feeling down, depressed, irritable, or hopeless Not at all   Total Score PHQ 2 0     Abuse Screening Questionnaire 10/4/2022   Do you ever feel afraid of your partner? N   Are you in a relationship with someone who physically or mentally threatens you? N   Is it safe for you to go home?  Y       ADL Assessment 10/4/2022   Feeding yourself No Help Needed   Getting from bed to chair No Help Needed   Getting dressed No Help Needed   Bathing or showering No Help Needed   Walk across the room (includes cane/walker) No Help Needed   Using the telphone No Help Needed   Taking your medications No Help Needed   Preparing meals Help Needed   Managing money (expenses/bills) Help Needed Moderately strenuous housework (laundry) No Help Needed   Shopping for personal items (toiletries/medicines) No Help Needed   Shopping for groceries No Help Needed   Driving Help Needed   Climbing a flight of stairs No Help Needed   Getting to places beyond walking distances Help Needed      Advance Care Planning 10/4/2022   Patient's Healthcare Decision Maker is: Named in scanned ACP document   Confirm Advance Directive Yes, on file   Patient Would Like to Complete Advance Directive -

## 2022-10-31 DIAGNOSIS — G40.909 SEIZURE DISORDER (HCC): ICD-10-CM

## 2022-10-31 DIAGNOSIS — Z51.81 THERAPEUTIC DRUG MONITORING: ICD-10-CM

## 2022-10-31 RX ORDER — PHENYTOIN SODIUM 100 MG/1
CAPSULE, EXTENDED RELEASE ORAL
Qty: 180 CAPSULE | Refills: 1 | Status: SHIPPED | OUTPATIENT
Start: 2022-10-31

## 2023-10-30 RX ORDER — PHENYTOIN SODIUM 100 MG/1
200 CAPSULE, EXTENDED RELEASE ORAL NIGHTLY
Qty: 60 CAPSULE | Refills: 1 | Status: SHIPPED | OUTPATIENT
Start: 2023-10-30 | End: 2023-12-06 | Stop reason: SDUPTHER

## 2023-10-31 RX ORDER — PHENYTOIN SODIUM 100 MG/1
CAPSULE, EXTENDED RELEASE ORAL
Qty: 180 CAPSULE | OUTPATIENT
Start: 2023-10-31

## 2023-11-20 ENCOUNTER — OFFICE VISIT (OUTPATIENT)
Age: 68
End: 2023-11-20
Payer: MEDICARE

## 2023-11-20 VITALS
WEIGHT: 123.8 LBS | BODY MASS INDEX: 19.89 KG/M2 | RESPIRATION RATE: 16 BRPM | HEIGHT: 66 IN | TEMPERATURE: 98 F | HEART RATE: 113 BPM | OXYGEN SATURATION: 99 % | SYSTOLIC BLOOD PRESSURE: 150 MMHG | DIASTOLIC BLOOD PRESSURE: 84 MMHG

## 2023-11-20 DIAGNOSIS — G40.909 SEIZURE DISORDER (HCC): ICD-10-CM

## 2023-11-20 DIAGNOSIS — Z00.00 MEDICARE ANNUAL WELLNESS VISIT, SUBSEQUENT: Primary | ICD-10-CM

## 2023-11-20 DIAGNOSIS — G80.8 OTHER CEREBRAL PALSY (HCC): ICD-10-CM

## 2023-11-20 DIAGNOSIS — Z12.11 COLON CANCER SCREENING: ICD-10-CM

## 2023-11-20 DIAGNOSIS — Z51.81 THERAPEUTIC DRUG MONITORING: ICD-10-CM

## 2023-11-20 PROCEDURE — G8484 FLU IMMUNIZE NO ADMIN: HCPCS | Performed by: FAMILY MEDICINE

## 2023-11-20 PROCEDURE — 3017F COLORECTAL CA SCREEN DOC REV: CPT | Performed by: FAMILY MEDICINE

## 2023-11-20 PROCEDURE — 1123F ACP DISCUSS/DSCN MKR DOCD: CPT | Performed by: FAMILY MEDICINE

## 2023-11-20 PROCEDURE — G0439 PPPS, SUBSEQ VISIT: HCPCS | Performed by: FAMILY MEDICINE

## 2023-11-20 SDOH — ECONOMIC STABILITY: FOOD INSECURITY: WITHIN THE PAST 12 MONTHS, YOU WORRIED THAT YOUR FOOD WOULD RUN OUT BEFORE YOU GOT MONEY TO BUY MORE.: NEVER TRUE

## 2023-11-20 SDOH — ECONOMIC STABILITY: FOOD INSECURITY: WITHIN THE PAST 12 MONTHS, THE FOOD YOU BOUGHT JUST DIDN'T LAST AND YOU DIDN'T HAVE MONEY TO GET MORE.: NEVER TRUE

## 2023-11-20 SDOH — ECONOMIC STABILITY: HOUSING INSECURITY
IN THE LAST 12 MONTHS, WAS THERE A TIME WHEN YOU DID NOT HAVE A STEADY PLACE TO SLEEP OR SLEPT IN A SHELTER (INCLUDING NOW)?: NO

## 2023-11-20 SDOH — ECONOMIC STABILITY: INCOME INSECURITY: HOW HARD IS IT FOR YOU TO PAY FOR THE VERY BASICS LIKE FOOD, HOUSING, MEDICAL CARE, AND HEATING?: NOT HARD AT ALL

## 2023-11-20 ASSESSMENT — PATIENT HEALTH QUESTIONNAIRE - PHQ9
SUM OF ALL RESPONSES TO PHQ QUESTIONS 1-9: 0
1. LITTLE INTEREST OR PLEASURE IN DOING THINGS: 0
2. FEELING DOWN, DEPRESSED OR HOPELESS: 0
SUM OF ALL RESPONSES TO PHQ9 QUESTIONS 1 & 2: 0

## 2023-11-20 ASSESSMENT — LIFESTYLE VARIABLES
HOW OFTEN DO YOU HAVE A DRINK CONTAINING ALCOHOL: NEVER
HOW MANY STANDARD DRINKS CONTAINING ALCOHOL DO YOU HAVE ON A TYPICAL DAY: PATIENT DOES NOT DRINK

## 2023-11-20 NOTE — PROGRESS NOTES
Catherine Hoffman is a 78 y.o. female who was seen by synchronous (real-time) audio technology on 10/4/2022. Pt was seen at home. Provider was at the office. Due to hx developmental delay/ CP, Parent/caregiver gave history for this encounter. Subjective:   Results (Discuss results)    HISTORY OF PRESENT ILLNESS  Catherine Hoffman is a 63 y.o. Female here for f/u seizure d/o. Doing well since last visit. Taking Dilantin 200 at bedtime. Jasen well. No seizures since last visit, actually in many years. Pt feeling well, eating normally. Lab Results   Component Value Date/Time    Phenytoin 8.2 (L) 09/20/2022 10:57 AM     Lab Results   Component Value Date/Time    WBC 4.6 09/20/2022 10:57 AM    HGB 13.6 09/20/2022 10:57 AM    HCT 40.2 09/20/2022 10:57 AM    PLATELET 580 26/74/2748 10:57 AM    MCV 91.2 09/20/2022 10:57 AM     Lab Results   Component Value Date/Time    Sodium 137 09/20/2022 10:57 AM    Potassium 3.9 09/20/2022 10:57 AM    Chloride 104 09/20/2022 10:57 AM    CO2 27 09/20/2022 10:57 AM    Anion gap 6 09/20/2022 10:57 AM    Glucose 90 09/20/2022 10:57 AM    BUN 12 09/20/2022 10:57 AM    Creatinine 0.83 09/20/2022 10:57 AM    BUN/Creatinine ratio 14 09/20/2022 10:57 AM    GFR est AA >60 09/20/2022 10:57 AM    GFR est non-AA >60 09/20/2022 10:57 AM    Calcium 9.4 09/20/2022 10:57 AM    Bilirubin, total 0.4 09/20/2022 10:57 AM    Alk.  phosphatase 126 (H) 09/20/2022 10:57 AM    Protein, total 8.0 09/20/2022 10:57 AM    Albumin 4.4 09/20/2022 10:57 AM    Globulin 3.6 09/20/2022 10:57 AM    A-G Ratio 1.2 09/20/2022 10:57 AM    ALT (SGPT) 25 09/20/2022 10:57 AM     Lab Results   Component Value Date/Time    Cholesterol, total 244 (H) 09/14/2020 08:59 AM    HDL Cholesterol 92 09/14/2020 08:59 AM    LDL, calculated 143 (H) 09/14/2020 08:59 AM    LDL, calculated 120 (H) 10/16/2018 03:32 PM    VLDL, calculated 9 09/14/2020 08:59 AM    VLDL, calculated 45 (H) 10/16/2018 03:32 PM    Triglyceride 58 09/14/2020 08:59 AM

## 2023-11-21 ENCOUNTER — TELEPHONE (OUTPATIENT)
Age: 68
End: 2023-11-21

## 2023-11-21 DIAGNOSIS — G80.9 CEREBRAL PALSY, UNSPECIFIED TYPE (HCC): ICD-10-CM

## 2023-11-21 DIAGNOSIS — G40.909 SEIZURE DISORDER (HCC): Primary | ICD-10-CM

## 2023-11-21 LAB
ANION GAP SERPL CALC-SCNC: 8 MMOL/L (ref 5–15)
BUN SERPL-MCNC: 11 MG/DL (ref 6–20)
BUN/CREAT SERPL: 15 (ref 12–20)
CALCIUM SERPL-MCNC: 9.2 MG/DL (ref 8.5–10.1)
CHLORIDE SERPL-SCNC: 102 MMOL/L (ref 97–108)
CO2 SERPL-SCNC: 26 MMOL/L (ref 21–32)
CREAT SERPL-MCNC: 0.75 MG/DL (ref 0.55–1.02)
GLUCOSE SERPL-MCNC: 95 MG/DL (ref 65–100)
POTASSIUM SERPL-SCNC: 3.8 MMOL/L (ref 3.5–5.1)
SODIUM SERPL-SCNC: 136 MMOL/L (ref 136–145)

## 2023-11-21 NOTE — TELEPHONE ENCOUNTER
Aman Arellano with [facility] called again. She has pt scheduled tomorrow for surgery but needs to know the last visit before ins can cover. Visit needs to be within the last 90 days.

## 2023-11-22 LAB
PHENYTOIN FREE SERPL-MCNC: ABNORMAL UG/ML (ref 1–2)
PHENYTOIN SERPL-MCNC: 6 UG/ML (ref 10–20)

## 2023-12-06 DIAGNOSIS — G40.909 SEIZURE DISORDER (HCC): Primary | ICD-10-CM

## 2023-12-06 RX ORDER — PHENYTOIN SODIUM 100 MG/1
200 CAPSULE, EXTENDED RELEASE ORAL NIGHTLY
Qty: 180 CAPSULE | Refills: 3 | Status: SHIPPED | OUTPATIENT
Start: 2023-12-06

## 2024-01-03 DIAGNOSIS — G40.909 SEIZURE DISORDER (HCC): ICD-10-CM

## 2024-01-03 RX ORDER — PHENYTOIN SODIUM 100 MG/1
200 CAPSULE, EXTENDED RELEASE ORAL NIGHTLY
Qty: 180 CAPSULE | Refills: 3 | Status: SHIPPED | OUTPATIENT
Start: 2024-01-03

## 2024-09-20 ENCOUNTER — LAB (OUTPATIENT)
Age: 69
End: 2024-09-20

## 2024-09-20 DIAGNOSIS — Z51.81 THERAPEUTIC DRUG MONITORING: ICD-10-CM

## 2024-09-20 DIAGNOSIS — G40.909 SEIZURE DISORDER (HCC): Primary | ICD-10-CM

## 2024-09-20 DIAGNOSIS — E78.2 MIXED HYPERLIPIDEMIA: ICD-10-CM

## 2024-09-20 DIAGNOSIS — G40.909 SEIZURE DISORDER (HCC): ICD-10-CM

## 2024-09-21 LAB
ANION GAP SERPL CALC-SCNC: 6 MMOL/L (ref 2–12)
BUN SERPL-MCNC: 8 MG/DL (ref 6–20)
BUN/CREAT SERPL: 10 (ref 12–20)
CALCIUM SERPL-MCNC: 9.6 MG/DL (ref 8.5–10.1)
CHLORIDE SERPL-SCNC: 105 MMOL/L (ref 97–108)
CHOLEST SERPL-MCNC: 280 MG/DL
CO2 SERPL-SCNC: 28 MMOL/L (ref 21–32)
CREAT SERPL-MCNC: 0.79 MG/DL (ref 0.55–1.02)
GLUCOSE SERPL-MCNC: 89 MG/DL (ref 65–100)
HDLC SERPL-MCNC: 114 MG/DL
HDLC SERPL: 2.5 (ref 0–5)
LDLC SERPL CALC-MCNC: 147.4 MG/DL (ref 0–100)
POTASSIUM SERPL-SCNC: 4.2 MMOL/L (ref 3.5–5.1)
SODIUM SERPL-SCNC: 139 MMOL/L (ref 136–145)
TRIGL SERPL-MCNC: 93 MG/DL
VLDLC SERPL CALC-MCNC: 18.6 MG/DL

## 2024-09-23 LAB
PHENYTOIN FREE SERPL-MCNC: 0.5 UG/ML (ref 1–2)
PHENYTOIN SERPL-MCNC: 7.7 UG/ML (ref 10–20)

## 2024-12-31 DIAGNOSIS — G40.909 SEIZURE DISORDER (HCC): ICD-10-CM

## 2025-01-02 RX ORDER — PHENYTOIN SODIUM 100 MG/1
200 CAPSULE, EXTENDED RELEASE ORAL NIGHTLY
Qty: 180 CAPSULE | Refills: 3 | Status: SHIPPED | OUTPATIENT
Start: 2025-01-02